# Patient Record
Sex: MALE | Race: BLACK OR AFRICAN AMERICAN | NOT HISPANIC OR LATINO | Employment: UNEMPLOYED | URBAN - METROPOLITAN AREA
[De-identification: names, ages, dates, MRNs, and addresses within clinical notes are randomized per-mention and may not be internally consistent; named-entity substitution may affect disease eponyms.]

---

## 2017-08-01 ENCOUNTER — CLINICAL SUPPORT (OUTPATIENT)
Dept: AUDIOLOGY | Facility: CLINIC | Age: 32
End: 2017-08-01
Payer: MEDICAID

## 2017-08-01 ENCOUNTER — OFFICE VISIT (OUTPATIENT)
Dept: OTOLARYNGOLOGY | Facility: CLINIC | Age: 32
End: 2017-08-01
Payer: MEDICAID

## 2017-08-01 VITALS
HEART RATE: 73 BPM | WEIGHT: 212.5 LBS | SYSTOLIC BLOOD PRESSURE: 163 MMHG | DIASTOLIC BLOOD PRESSURE: 93 MMHG | TEMPERATURE: 99 F

## 2017-08-01 DIAGNOSIS — H91.90 PERCEIVED HEARING LOSS: Primary | ICD-10-CM

## 2017-08-01 DIAGNOSIS — H69.93 ETD (EUSTACHIAN TUBE DYSFUNCTION), BILATERAL: Primary | ICD-10-CM

## 2017-08-01 DIAGNOSIS — H91.90 PERCEIVED HEARING CHANGES: ICD-10-CM

## 2017-08-01 PROCEDURE — 99204 OFFICE O/P NEW MOD 45 MIN: CPT | Mod: S$PBB,,, | Performed by: PHYSICIAN ASSISTANT

## 2017-08-01 PROCEDURE — 99999 PR PBB SHADOW E&M-EST. PATIENT-LVL III: CPT | Mod: PBBFAC,,, | Performed by: PHYSICIAN ASSISTANT

## 2017-08-01 RX ORDER — FLUTICASONE PROPIONATE 50 MCG
2 SPRAY, SUSPENSION (ML) NASAL DAILY
Qty: 1 BOTTLE | Refills: 12 | Status: SHIPPED | OUTPATIENT
Start: 2017-08-01

## 2017-08-03 ENCOUNTER — CLINICAL SUPPORT (OUTPATIENT)
Dept: AUDIOLOGY | Facility: CLINIC | Age: 32
End: 2017-08-03
Payer: MEDICAID

## 2017-08-03 DIAGNOSIS — H69.93 EUSTACHIAN TUBE DYSFUNCTION, BILATERAL: Primary | ICD-10-CM

## 2017-08-03 PROCEDURE — 92567 TYMPANOMETRY: CPT | Mod: PBBFAC | Performed by: AUDIOLOGIST-HEARING AID FITTER

## 2017-08-03 PROCEDURE — 92557 COMPREHENSIVE HEARING TEST: CPT | Mod: PBBFAC | Performed by: AUDIOLOGIST-HEARING AID FITTER

## 2017-08-03 NOTE — PROGRESS NOTES
Alonso Calrisle was seen 08/03/2017 for an audiological evaluation, referred by Ginger Henry PA-C for bilateral eustachian tube dysfnction, tympanosclerosis and perceived changes in hearing.  Patient has history of fluid in the ears several years ago with what sounds like a conductive loss at that time.  He had PE tubes as a child.   He notes seasonal ear pressure.  He denies hearing loss or tinnitus.  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  He denies current exposure to excessive noise.     Results reveal normal hearing loss 250-8000 Hz for the right ear, and normal hearing 250-8000 Hz for the left ear.   Speech Reception Thresholds were  10 dBHL for the right ear and 15 dBHL for the left ear.   Word recognition scores were excellent for the right ear and excellent for the left ear.   Tympanograms were Type C for the right ear and Type C for the left ear.    Patient was counseled on the above findings.  Confirmed ENT diagnosis of ETD and explained what that means and why nasal spray is recommended.     Recommendations:  1. ENT review - encouraged patient to continue nasal spray per ENT recommendation  2. Hearing protection around loud noises including recreational/lawn equipment

## 2019-07-19 NOTE — PROGRESS NOTES
Subjective:       Patient ID: Alonso Carlisle is a 31 y.o. male.    Chief Complaint: Ear Fullness (eval for fluid )    Patient is a very pleasant 31-year-old male here to see me today for the first time for evaluation of possible fluid in his ears.  He reports pressure in both ears in April and May of this year; he was told by his child's ENT that he had fluid in both ears and antibiotics were prescribed.  Since then he's felt better but now has insurance and wanted an evaluation.  In April he felt like his voice was louder than normal.  Denies changes in his hearing currently.  Denies ear pain or drainage.  Both ears occasionally pop; it happened on her recent flight.  He denies sneezing or itchy watery eyes.  Denies rhinorrhea or nasal congestion.  Denies previous otologic surgery other than tubes as a child.  Denies family history of hearing loss.  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  Denies issues with dizziness.  Not currently using eardrops or nasal sprays.  He does smoke 1 pack every 2 days for over 10 years.  Denies fever.  Denies grinding or clenching his teeth but notes two broken teeth on the right side.      Review of Systems   Constitutional: Negative for activity change, appetite change and fever.   HENT: Positive for hearing loss. Negative for congestion, ear discharge, ear pain, nosebleeds, rhinorrhea, sinus pressure, sneezing, sore throat, tinnitus and trouble swallowing.    Eyes: Negative for discharge and itching.   Respiratory: Negative for cough and shortness of breath.    Cardiovascular: Negative for chest pain and palpitations.   Gastrointestinal: Negative for diarrhea, nausea and vomiting.   Musculoskeletal: Negative for arthralgias and neck pain.   Allergic/Immunologic: Negative for environmental allergies and food allergies.   Neurological: Negative for dizziness, light-headedness and headaches.   Hematological: Negative for adenopathy.    Psychiatric/Behavioral: Negative for sleep disturbance.       Objective:      Physical Exam   Constitutional: He is oriented to person, place, and time. He appears well-developed and well-nourished.   HENT:   Head: Normocephalic and atraumatic.   Right Ear: Hearing, external ear and ear canal normal. Tympanic membrane is scarred (tympanosclerosis) and retracted. No middle ear effusion.   Left Ear: Hearing, external ear and ear canal normal. Tympanic membrane is scarred (tympanosclerosis) and retracted.  No middle ear effusion.   Nose: Mucosal edema present. No rhinorrhea, nasal deformity or septal deviation. No epistaxis. Right sinus exhibits no maxillary sinus tenderness and no frontal sinus tenderness. Left sinus exhibits no maxillary sinus tenderness and no frontal sinus tenderness.   Mouth/Throat: Uvula is midline, oropharynx is clear and moist and mucous membranes are normal. Mucous membranes are not pale and not dry. No trismus in the jaw. Abnormal dentition (broken molars on right upper and lower). No uvula swelling or dental caries. No oropharyngeal exudate or posterior oropharyngeal erythema. Tonsils are 3+ on the right. Tonsils are 3+ on the left. No tonsillar exudate.   Eyes: Conjunctivae, EOM and lids are normal. Pupils are equal, round, and reactive to light. Right eye exhibits no chemosis. Left eye exhibits no chemosis. Right conjunctiva is not injected. Left conjunctiva is not injected. No scleral icterus. Right eye exhibits normal extraocular motion and no nystagmus. Left eye exhibits normal extraocular motion and no nystagmus.   Neck: Trachea normal and phonation normal. No tracheal tenderness present. No tracheal deviation present. No thyroid mass and no thyromegaly present.   Cardiovascular: Intact distal pulses.    Pulmonary/Chest: Effort normal. No stridor. No respiratory distress.   Abdominal: He exhibits no distension.   Lymphadenopathy:        Head (right side): No submental, no  submandibular, no preauricular and no posterior auricular adenopathy present.        Head (left side): No submental, no submandibular, no preauricular and no posterior auricular adenopathy present.     He has no cervical adenopathy.   Neurological: He is alert and oriented to person, place, and time. No cranial nerve deficit.   Skin: Skin is warm and dry. No rash noted. No erythema.   Psychiatric: He has a normal mood and affect. His speech is normal and behavior is normal.   Vitals reviewed.      Assessment:       1. ETD (eustachian tube dysfunction), bilateral    2. Perceived hearing changes        Plan:         1.  ETD:  We had a long discussion regarding the anatomy and function of the eustachian tube.  We discussed that the eustachian tube acts as a pump to keep the appropriate amount of pressure behind the ear drum.  I gave the patient a prescription for a nasal steroid spray to be used on a daily basis, and we discussed that it will take 2-3 weeks of daily use to achieve maximal effectiveness.  2.  Perceived hearing changes:  Recommend scheduling audiogram at his convenience and will call with results once available.   Gail